# Patient Record
(demographics unavailable — no encounter records)

---

## 2025-04-30 NOTE — HEALTH RISK ASSESSMENT
[0] : 2) Feeling down, depressed, or hopeless: Not at all (0) [PHQ-2 Negative - No further assessment needed] : PHQ-2 Negative - No further assessment needed [Former] : Former [[id number]] : 0

## 2025-04-30 NOTE — PLAN
[FreeTextEntry1] : He brought his medications in with him today.  He had all of the medications except for metoprolol.  It is clear that he is not taking his medications consistently at all.  Have advised him to restart his medications.  Given his history of coronary artery disease he should be on a beta-blocker but will start at a low dose given his lower weight and heart rate in the 50s.  He will return in 2 weeks for a blood pressure check.  May need to reduce the dose of valsartan also.  Diabetes Check hemoglobin A1c and lipids today Continue Januvia and Synjardy Maintain low carbohydrate diet f/u endocrinology  HTN BP is controlled on Valsartan 160 mg. Given his history of coronary artery disease he should be on a beta-blocker but will start at a low dose given his lower weight and heart rate in the 50s.  He will return in 2 weeks for a blood pressure check.  May need to reduce the dose of valsartan also.  h/o CAD and CVA Continue ASA 81 m g and Crestor 40 mg f/u cardiology and neurology  Memory impairment Exelon patch per neurology  Weight loss He has a good appetite and no concerning symptoms. His diet has changed  Health Maintenance Depression screening negative  BP is controlled Maintain healthy diet. Encouraged walking Check labs today, Blood collected in office.  Follow-up 2 weeks

## 2025-04-30 NOTE — HEALTH RISK ASSESSMENT
[0] : 2) Feeling down, depressed, or hopeless: Not at all (0) [PHQ-2 Negative - No further assessment needed] : PHQ-2 Negative - No further assessment needed [Former] : Former [[id number]] : 0

## 2025-04-30 NOTE — HISTORY OF PRESENT ILLNESS
[Other: _____] : [unfilled] [FreeTextEntry1] : f/u DM, HTN, CAD [de-identified] : 81 y/o male with h/o DM,HTN, CAD s/p MI in the , CVA in 2024 who presents for an annual physical. He was in [place] for his sister's . He forgot his medications at home. He was able to get a few medicines while he was in [place]. His wife thought he didn't look well. But he states he was withdrawn due to other reasons. His wife says he is not active and is sleeping a lot.He states he feels a cold coming on. Took Corcidin. He sees neurology and was started on an Exelon patch for memory. However he has not been using it consistently.   Has problems with his right eye Had 2 hearing tests which were fine Sees an endocrinologist but has not had regular follow up in the past year. Has a Dexcom. The most recent time he placed it, it wasn't working correctly. He thinks he has been compliant with Synjardy but not Januvia Sees urologist Sees cardiology He has lost weight. His wife has made changes to their diet.. ie eating cauliflower rice and keto bread. His appetite is good. No changes in Bowel movements. no blood in stool. NO nausea/vomiting. No cough No exercise Rarely eats out. His wife cooks colonoscopy done in the past

## 2025-04-30 NOTE — HISTORY OF PRESENT ILLNESS
[Other: _____] : [unfilled] [FreeTextEntry1] : f/u DM, HTN, CAD [de-identified] : 81 y/o male with h/o DM,HTN, CAD s/p MI in the , CVA in 2024 who presents for an annual physical. He was in [place] for his sister's . He forgot his medications at home. He was able to get a few medicines while he was in [place]. His wife thought he didn't look well. But he states he was withdrawn due to other reasons. His wife says he is not active and is sleeping a lot.He states he feels a cold coming on. Took Corcidin. He sees neurology and was started on an Exelon patch for memory. However he has not been using it consistently.   Has problems with his right eye Had 2 hearing tests which were fine Sees an endocrinologist but has not had regular follow up in the past year. Has a Dexcom. The most recent time he placed it, it wasn't working correctly. He thinks he has been compliant with Synjardy but not Januvia Sees urologist Sees cardiology He has lost weight. His wife has made changes to their diet.. ie eating cauliflower rice and keto bread. His appetite is good. No changes in Bowel movements. no blood in stool. NO nausea/vomiting. No cough No exercise Rarely eats out. His wife cooks colonoscopy done in the past

## 2025-05-14 NOTE — HEALTH RISK ASSESSMENT
[0] : 2) Feeling down, depressed, or hopeless: Not at all (0) [PHQ-2 Negative - No further assessment needed] : PHQ-2 Negative - No further assessment needed [Former] : Former [[id number]] : 0

## 2025-05-14 NOTE — PHYSICAL EXAM
[No Acute Distress] : no acute distress [Well Nourished] : well nourished [Normal Sclera/Conjunctiva] : normal sclera/conjunctiva [Normal Rate] : normal rate  [Normal Affect] : the affect was normal [Normal Insight/Judgement] : insight and judgment were intact [No Respiratory Distress] : no respiratory distress  [No Accessory Muscle Use] : no accessory muscle use

## 2025-05-14 NOTE — HISTORY OF PRESENT ILLNESS
[Other: _____] : [unfilled] [FreeTextEntry1] : f/u BP, DM, CAD [de-identified] : 83 y/o male with h/o DM,HTN, CAD s/p MI in the 90s, CVA in January 2024 who presents for follow up. Last visi the was restarted on Metoprolol but the dose was reduced to 25 mg due to BP and HR. He is still taking Valsartan 160 mg. He was instructed to cut it in half but he did not understand the instruction so he's taking a full pill. he denies lightheadedness and dizziness. Most recent Glucose was 123 on his Dexcom. The janet shows he is within range 86% of the time.  He sees neurology and was started on an Exelon patch for memory. However he has not been using it consistently.   Has problems with his right eye Had 2 hearing tests which were fine Sees an endocrinologist but has not had regular follow up in the past year. Has a Dexcom.He is taking both Synjardy and Januvia again. Sees urologist Sees cardiology He has lost weight. His wife has made changes to their diet.. ie eating cauliflower rice and keto bread. His appetite is good. No changes in Bowel movements. no blood in stool. NO nausea/vomiting. No cough No exercise Rarely eats out. His wife cooks colonoscopy done in the past

## 2025-05-14 NOTE — PLAN
[FreeTextEntry1] : Labs reviewed again with patient  Diabetes hemoglobin A1c 8.7 Continue Januvia and Synjardy Maintain low carbohydrate diet f/u endocrinology  HTN BP is controlled on Valsartan 160 mg.   h/o CAD and CVA Continue ASA 81 m g and Crestor 40 mg f/u cardiology and neurology  Memory impairment Exelon patch per neurology  Weight loss He has a good appetite and no concerning symptoms. His diet has changed  Health Maintenance Depression screening negative  BP is controlled Maintain healthy diet. Encouraged walking Check labs today, Blood collected in office.  Follow-up 2 weeks